# Patient Record
Sex: MALE | Race: WHITE | NOT HISPANIC OR LATINO | URBAN - METROPOLITAN AREA
[De-identification: names, ages, dates, MRNs, and addresses within clinical notes are randomized per-mention and may not be internally consistent; named-entity substitution may affect disease eponyms.]

---

## 2024-09-25 ENCOUNTER — HOSPITAL ENCOUNTER (EMERGENCY)
Facility: HOSPITAL | Age: 29
Discharge: HOME | End: 2024-09-25
Attending: EMERGENCY MEDICINE

## 2024-09-25 VITALS
TEMPERATURE: 98 F | WEIGHT: 195 LBS | SYSTOLIC BLOOD PRESSURE: 126 MMHG | RESPIRATION RATE: 16 BRPM | BODY MASS INDEX: 25.03 KG/M2 | DIASTOLIC BLOOD PRESSURE: 78 MMHG | HEART RATE: 100 BPM | HEIGHT: 74 IN | OXYGEN SATURATION: 94 %

## 2024-09-25 DIAGNOSIS — I48.91 ATRIAL FIBRILLATION: ICD-10-CM

## 2024-09-25 DIAGNOSIS — I48.91 ATRIAL FIBRILLATION WITH RVR: Primary | ICD-10-CM

## 2024-09-25 DIAGNOSIS — R00.2 PALPITATIONS: ICD-10-CM

## 2024-09-25 LAB
ALBUMIN SERPL BCP-MCNC: 4.8 G/DL (ref 3.5–5.2)
ALP SERPL-CCNC: 88 U/L (ref 55–135)
ALT SERPL W/O P-5'-P-CCNC: 36 U/L (ref 10–44)
AMPHET+METHAMPHET UR QL: NEGATIVE
ANION GAP SERPL CALC-SCNC: 14 MMOL/L (ref 8–16)
AST SERPL-CCNC: 34 U/L (ref 10–40)
BARBITURATES UR QL SCN>200 NG/ML: NEGATIVE
BASOPHILS # BLD AUTO: 0.03 K/UL (ref 0–0.2)
BASOPHILS NFR BLD: 0.4 % (ref 0–1.9)
BENZODIAZ UR QL SCN>200 NG/ML: NEGATIVE
BILIRUB SERPL-MCNC: 1.4 MG/DL (ref 0.1–1)
BNP SERPL-MCNC: 30 PG/ML (ref 0–99)
BUN SERPL-MCNC: 14 MG/DL (ref 6–20)
BZE UR QL SCN: NEGATIVE
CALCIUM SERPL-MCNC: 10.1 MG/DL (ref 8.7–10.5)
CANNABINOIDS UR QL SCN: NEGATIVE
CHLORIDE SERPL-SCNC: 111 MMOL/L (ref 95–110)
CO2 SERPL-SCNC: 17 MMOL/L (ref 23–29)
CREAT SERPL-MCNC: 1.2 MG/DL (ref 0.5–1.4)
CREAT UR-MCNC: 31 MG/DL (ref 23–375)
DIFFERENTIAL METHOD BLD: NORMAL
EOSINOPHIL # BLD AUTO: 0 K/UL (ref 0–0.5)
EOSINOPHIL NFR BLD: 0.4 % (ref 0–8)
ERYTHROCYTE [DISTWIDTH] IN BLOOD BY AUTOMATED COUNT: 12 % (ref 11.5–14.5)
EST. GFR  (NO RACE VARIABLE): >60 ML/MIN/1.73 M^2
GLUCOSE SERPL-MCNC: 89 MG/DL (ref 70–110)
HCT VFR BLD AUTO: 51.2 % (ref 40–54)
HGB BLD-MCNC: 17.7 G/DL (ref 14–18)
IMM GRANULOCYTES # BLD AUTO: 0.01 K/UL (ref 0–0.04)
IMM GRANULOCYTES NFR BLD AUTO: 0.1 % (ref 0–0.5)
LYMPHOCYTES # BLD AUTO: 1.9 K/UL (ref 1–4.8)
LYMPHOCYTES NFR BLD: 25.9 % (ref 18–48)
MAGNESIUM SERPL-MCNC: 2.1 MG/DL (ref 1.6–2.6)
MCH RBC QN AUTO: 29.5 PG (ref 27–31)
MCHC RBC AUTO-ENTMCNC: 34.6 G/DL (ref 32–36)
MCV RBC AUTO: 86 FL (ref 82–98)
METHADONE UR QL SCN>300 NG/ML: NEGATIVE
MONOCYTES # BLD AUTO: 0.5 K/UL (ref 0.3–1)
MONOCYTES NFR BLD: 6.4 % (ref 4–15)
NEUTROPHILS # BLD AUTO: 4.8 K/UL (ref 1.8–7.7)
NEUTROPHILS NFR BLD: 66.8 % (ref 38–73)
NRBC BLD-RTO: 0 /100 WBC
OHS QRS DURATION: 80 MS
OHS QRS DURATION: 86 MS
OHS QTC CALCULATION: 403 MS
OHS QTC CALCULATION: 456 MS
OPIATES UR QL SCN: NEGATIVE
PCP UR QL SCN>25 NG/ML: NEGATIVE
PHOSPHATE SERPL-MCNC: 3.2 MG/DL (ref 2.7–4.5)
PLATELET # BLD AUTO: 263 K/UL (ref 150–450)
PMV BLD AUTO: 10.6 FL (ref 9.2–12.9)
POTASSIUM SERPL-SCNC: 4.5 MMOL/L (ref 3.5–5.1)
PROT SERPL-MCNC: 7.4 G/DL (ref 6–8.4)
RBC # BLD AUTO: 5.99 M/UL (ref 4.6–6.2)
SODIUM SERPL-SCNC: 142 MMOL/L (ref 136–145)
T4 FREE SERPL-MCNC: 0.96 NG/DL (ref 0.71–1.51)
TOXICOLOGY INFORMATION: NORMAL
TROPONIN I SERPL DL<=0.01 NG/ML-MCNC: 0.01 NG/ML (ref 0–0.03)
TSH SERPL DL<=0.005 MIU/L-ACNC: 0.47 UIU/ML (ref 0.4–4)
WBC # BLD AUTO: 7.22 K/UL (ref 3.9–12.7)

## 2024-09-25 PROCEDURE — 84484 ASSAY OF TROPONIN QUANT: CPT

## 2024-09-25 PROCEDURE — 93010 ELECTROCARDIOGRAM REPORT: CPT | Mod: 76,,, | Performed by: INTERNAL MEDICINE

## 2024-09-25 PROCEDURE — 85025 COMPLETE CBC W/AUTO DIFF WBC: CPT

## 2024-09-25 PROCEDURE — 83880 ASSAY OF NATRIURETIC PEPTIDE: CPT

## 2024-09-25 PROCEDURE — 93010 ELECTROCARDIOGRAM REPORT: CPT | Mod: ,,, | Performed by: INTERNAL MEDICINE

## 2024-09-25 PROCEDURE — 96375 TX/PRO/DX INJ NEW DRUG ADDON: CPT

## 2024-09-25 PROCEDURE — 93005 ELECTROCARDIOGRAM TRACING: CPT

## 2024-09-25 PROCEDURE — 84439 ASSAY OF FREE THYROXINE: CPT

## 2024-09-25 PROCEDURE — 25000003 PHARM REV CODE 250: Performed by: EMERGENCY MEDICINE

## 2024-09-25 PROCEDURE — 80307 DRUG TEST PRSMV CHEM ANLYZR: CPT

## 2024-09-25 PROCEDURE — 99291 CRITICAL CARE FIRST HOUR: CPT

## 2024-09-25 PROCEDURE — 84100 ASSAY OF PHOSPHORUS: CPT

## 2024-09-25 PROCEDURE — 25000003 PHARM REV CODE 250

## 2024-09-25 PROCEDURE — 83735 ASSAY OF MAGNESIUM: CPT

## 2024-09-25 PROCEDURE — 96374 THER/PROPH/DIAG INJ IV PUSH: CPT

## 2024-09-25 PROCEDURE — 84443 ASSAY THYROID STIM HORMONE: CPT

## 2024-09-25 PROCEDURE — 80053 COMPREHEN METABOLIC PANEL: CPT

## 2024-09-25 PROCEDURE — 96361 HYDRATE IV INFUSION ADD-ON: CPT

## 2024-09-25 RX ORDER — METOPROLOL SUCCINATE 50 MG/1
50 TABLET, EXTENDED RELEASE ORAL DAILY
Qty: 30 TABLET | Refills: 0 | Status: SHIPPED | OUTPATIENT
Start: 2024-09-25 | End: 2025-09-25

## 2024-09-25 RX ORDER — METOPROLOL TARTRATE 1 MG/ML
5 INJECTION, SOLUTION INTRAVENOUS EVERY 10 MIN PRN
Status: COMPLETED | OUTPATIENT
Start: 2024-09-25 | End: 2024-09-25

## 2024-09-25 RX ORDER — METOPROLOL TARTRATE 1 MG/ML
5 INJECTION, SOLUTION INTRAVENOUS EVERY 10 MIN PRN
Status: DISCONTINUED | OUTPATIENT
Start: 2024-09-25 | End: 2024-09-25

## 2024-09-25 RX ADMIN — METOROPROLOL TARTRATE 5 MG: 5 INJECTION, SOLUTION INTRAVENOUS at 02:09

## 2024-09-25 RX ADMIN — SODIUM CHLORIDE 1000 ML: 9 INJECTION, SOLUTION INTRAVENOUS at 03:09

## 2024-09-25 RX ADMIN — METOROPROLOL TARTRATE 5 MG: 5 INJECTION, SOLUTION INTRAVENOUS at 01:09

## 2024-09-25 NOTE — DISCHARGE INSTRUCTIONS
We ordered metoprolol succinate (torprol-xl) to maintain rate control of your atrial fibrillation and apixaban (eliquis) to begin taking in preparation for possible cardioversion in the future. Follow-up with cardiology (referral is placed) to repeat an EKG and for further management of atrial fibrillation.

## 2024-09-25 NOTE — ED NOTES
Reji Badillo, a 28 y.o. male presents to the ED w/ complaint of palpitations which began earlier while at work    Triage note:  Chief Complaint   Patient presents with    Palpitations     Pt arrives c/o palpitations and dizziness.     Review of patient's allergies indicates:  No Known Allergies  History reviewed. No pertinent past medical history.     LOC: The patient is awake, alert, aware of environment with an appropriate affect. Oriented x4, speaking appropriately  APPEARANCE: Pt resting comfortably, in no acute distress, pt is clean and well groomed, clothing properly fastened  SKIN:The skin is warm and dry, color consistent with ethnicity, patient has normal skin turgor and moist mucus membranes, no bruising noted   RESPIRATORY:Airway is open and patent, respirations are spontaneous, patient has a normal effort and rate, no accessory muscle use noted.  CARDIAC: tachy rate and irregular rhythm, no peripheral edema noted, capillary refill < 3 seconds, bilateral radial pulses 2+.  ABDOMEN: Soft, non tender, non distended. No complaints of n/v/d  NEUROLOGIC: PERRLA, facial expression is symmetrical, patient moving all extremities spontaneously, normal sensation in all extremities when touched with a finger.  Follows all commands appropriately  MUSCULOSKELETAL: Patient moving all extremities spontaneously, no obvious swelling or deformities noted.

## 2024-09-25 NOTE — ED PROVIDER NOTES
Encounter Date: 9/25/2024       History     Chief Complaint   Patient presents with    Palpitations     Pt arrives c/o palpitations and dizziness.     Reji Badillo is a 29yo M with no significant past medical history who presents to the ED for palpitations. 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 He has no known cardiac or thyroid disease, takes no medications, denies drug use, no known sick contacts or recent illnesses, no known exposures.     The history is provided by the patient. No  was used.     Review of patient's allergies indicates:  No Known Allergies  History reviewed. No pertinent past medical history.  Past Surgical History:   Procedure Laterality Date    SEPTOPLASTY, NOSE, WITH NASAL TURBINATE REDUCTION Bilateral 09/19/2023     No family history on file.  Social History     Tobacco Use    Smoking status: Never    Smokeless tobacco: Never     Review of Systems    Physical Exam     Initial Vitals [09/25/24 0016]   BP Pulse Resp Temp SpO2   (!) 140/88 98 14 97.8 °F (36.6 °C) 98 %      MAP       --         Physical Exam    Constitutional: He appears well-developed and well-nourished. He is not diaphoretic.   HENT:   Head: Normocephalic and atraumatic.   Eyes: EOM are normal.   Neck:   Normal range of motion.  Cardiovascular:  Intact distal pulses. An irregularly irregular rhythm present.   Tachycardia present.         No murmur heard.  Pulses:       Radial pulses are 2+ on the right side and 2+ on the left side.   Pulmonary/Chest: Breath sounds normal. No respiratory distress. He has no wheezes.   Abdominal: Abdomen is soft. He exhibits no distension. There is no abdominal tenderness.   Musculoskeletal:         General: No edema.      Cervical back: Normal range of motion.     Neurological: He is alert and oriented to person, place, and time.   Skin: Skin is warm and dry. Capillary refill takes less than 2 seconds.    Psychiatric: He has a normal mood and affect.         ED Course   Critical Care    Date/Time: 9/25/2024 4:17 AM    Performed by: Shoaib Ren III, MD  Authorized by: Shoaib Rne III, MD  Total critical care time (exclusive of procedural time) : 30 minutes  Critical care time was exclusive of separately billable procedures and treating other patients and teaching time.  Critical care was necessary to treat or prevent imminent or life-threatening deterioration of the following conditions: cardiac failure.  Comments: Patient required numerous evaluations of his cardiopulmonary status during his course in the emergency department for his life-threatening AFib with RVR requiring multiple doses of IV rate control medication        Labs Reviewed   COMPREHENSIVE METABOLIC PANEL - Abnormal       Result Value    Sodium 142      Potassium 4.5      Chloride 111 (*)     CO2 17 (*)     Glucose 89      BUN 14      Creatinine 1.2      Calcium 10.1      Total Protein 7.4      Albumin 4.8      Total Bilirubin 1.4 (*)     Alkaline Phosphatase 88      AST 34      ALT 36      eGFR >60.0      Anion Gap 14      Narrative:     Release to patient->Immediate   TSH    TSH 0.475      Narrative:     Release to patient->Immediate   TROPONIN I    Troponin I 0.009      Narrative:     Release to patient->Immediate   CBC W/ AUTO DIFFERENTIAL    WBC 7.22      RBC 5.99      Hemoglobin 17.7      Hematocrit 51.2      MCV 86      MCH 29.5      MCHC 34.6      RDW 12.0      Platelets 263      MPV 10.6      Immature Granulocytes 0.1      Gran # (ANC) 4.8      Immature Grans (Abs) 0.01      Lymph # 1.9      Mono # 0.5      Eos # 0.0      Baso # 0.03      nRBC 0      Gran % 66.8      Lymph % 25.9      Mono % 6.4      Eosinophil % 0.4      Basophil % 0.4      Differential Method Automated      Narrative:     Release to patient->Immediate   B-TYPE NATRIURETIC PEPTIDE    BNP 30      Narrative:     Release to patient->Immediate   MAGNESIUM    Magnesium 2.1       Narrative:     Release to patient->Immediate   PHOSPHORUS    Phosphorus 3.2      Narrative:     Release to patient->Immediate   DRUG SCREEN PANEL, URINE EMERGENCY    Benzodiazepines Negative      Methadone metabolites Negative      Cocaine (Metab.) Negative      Opiate Scrn, Ur Negative      Barbiturate Screen, Ur Negative      Amphetamine Screen, Ur Negative      THC Negative      Phencyclidine Negative      Creatinine, Urine 31.0      Toxicology Information SEE COMMENT      Narrative:     Specimen Source->Urine   T4, FREE    Free T4 0.96      Narrative:     Release to patient->Immediate          Imaging Results    None          Medications   metoprolol injection 5 mg (5 mg Intravenous Given 9/25/24 0219)   sodium chloride 0.9% bolus 1,000 mL 1,000 mL (0 mLs Intravenous Stopped 9/25/24 6985)     Medical Decision Making  27yo with no significant medical history who presented for palpitations. Found to be in atrial fibrillation with RVR.     Differentials for inciting etiology include structural heart disease, cardiac valve dysfunction, thyroid disorder, substance use, electrolyte disturbance, pericarditis, ACS, AFib with RVR, SVT    Patient comfortable aside from palpitations and blood pressure normo to hypertensive. Stable. Metoprolol IV 5mg x3 given with rate control. 1L fluid bolus given. Patient notes he does not have health insurance and is concerned about expense. Patient declined chest X-ray.     Plan for discharge home with metoprolol succinate for ongoing rate control. Apixaban 5mg BID in preparation for possible future cardioversion if persistent atrial fibrillation, follow-up with cardiology outpatient. Referral placed.     Amount and/or Complexity of Data Reviewed  External Data Reviewed: notes.  Labs: ordered. Decision-making details documented in ED Course.  Radiology: ordered.  ECG/medicine tests: ordered and independent interpretation performed.     Details: EKG: AFib with RVR, mild diffuse  J-point elevation in all leads about 1 box diffusely    Risk  Prescription drug management.  Drug therapy requiring intensive monitoring for toxicity.              Attending Attestation:   Physician Attestation Statement for Resident:  As the supervising MD   Physician Attestation Statement: I have personally seen and examined this patient.   I agree with the above history.  -: Palpitations, new onset AFib with RVR   As the supervising MD I agree with the above PE.     As the supervising MD I agree with the above treatment, course, plan, and disposition.                    ED Course as of 09/25/24 0417   Wed Sep 25, 2024   0153 CBC auto differential  CBC unremarkable [AK]   0154 X-Ray Chest PA And Lateral  Pt declined CXR [AK]   0220 Drug screen panel, emergency  Negative UDS [AK]   0220 Comprehensive metabolic panel(!)  Bicarb 17, otherwise unremarkable [AK]   0221 Magnesium  normal [AK]   0221 Phosphorus  normal [AK]   0221 Troponin I  Negative troponin [AK]   0221 Brain natriuretic peptide  Negative BNP [AK]      ED Course User Index  [AK] Sunil Sheldon MD                             Clinical Impression:  Final diagnoses:  [I48.91] Atrial fibrillation with RVR (Primary)  [R00.2] Palpitations  [I48.91] Atrial fibrillation          ED Disposition Condition    Discharge Stable          ED Prescriptions       Medication Sig Dispense Start Date End Date Auth. Provider    apixaban (ELIQUIS) 5 mg Tab Take 1 tablet (5 mg total) by mouth 2 (two) times daily. 60 tablet 9/25/2024 -- Sunil Sheldon MD    metoprolol succinate (TOPROL-XL) 50 MG 24 hr tablet Take 1 tablet (50 mg total) by mouth once daily. 30 tablet 9/25/2024 9/25/2025 Sunil Sheldon MD          Follow-up Information    None          Sunil Sheldon MD  Resident  09/25/24 031       Shoaib Ren III, MD  09/25/24 0354

## 2024-09-25 NOTE — ED NOTES
Nurses Note -- 4 Eyes      9/25/2024   0350      Skin assessed during: Admit      [x] No Altered Skin Integrity Present    []Prevention Measures Documented      [] Yes- Altered Skin Integrity Present or Discovered   [] LDA Added if Not in Epic (Describe Wound)   [] New Altered Skin Integrity was Present on Admit and Documented in LDA   [] Wound Image Taken    Wound Care Consulted? No    Attending Nurse:  Roxanne Vanegas RN     Second RN/Staff Member:

## 2024-09-27 ENCOUNTER — TELEPHONE (OUTPATIENT)
Dept: PHARMACY | Facility: CLINIC | Age: 29
End: 2024-09-27

## 2024-09-27 NOTE — LETTER
September 27, 2024    Reji Badillo  [address] [facility] [address]             [facility]  [address]  [facility] [address]  Fax: [phone] Dear Mr. Badillo,    My name is Daniele Barbara  I am reaching out on behalf of [facility]s Pharmacy Patient Assistance Team after receiving a referral from your Provider inquiring about assistance with your medication. I tried to contact you on 9/27/2024 . Sorry we missed you. Our goal is to assist qualified patients with financial assistance for their medications to better help enrollment for their medications to better help you achieve your health goals.    Please note that enrollment into available support will require the following documents:    Proof of household Income (such as social security statement, 1099 form, pension statement or 3 consecutive pay stubs)  Copy of all insurance cards (front and back)  Print out from your insurance or pharmacy showing how much you have spent on prescriptions this year  Completed Medication Access Center Authorization Forms       Please reach out to my phone number below if you are still in need of assistance with your medications. Follow-up attempt to reach you through MyChart or letter will be made in 5 business days. We look forward to hearing from you soon!    Thank you for choosing [facility] for your healthcare needs    Sincerely  Daniele MALHOTRA @[phone]  Pharmacy Patient Assistance Team  [address]  [id number]  [place] [address]  Fax: [phone]  Email: [email or web address]

## 2024-09-27 NOTE — TELEPHONE ENCOUNTER
We have reached out to Mr. Badillo to inform him of the [employer]  application process for Eliquis and whats required to apply.  He did not answer.         I mailed a letter and sent a portal message   I left a voicemail       Follow-up will be made in 5 business days.    Daniele Jimenez  Pharmacy Patient Assistance Team

## 2024-09-27 NOTE — TELEPHONE ENCOUNTER
----- Message from Pat Adair sent at 9/27/2024  4:16 PM CDT -----  Good afternoon,         I was a patient at [facility] this morning and was discharged around 4am. I was treated for new onset Afib RVR and now need eliquis for anticoagulation. I was hoping you could contact me regarding financial assistance in obtaining this medication. Please reach out to me as soon as possible. My phone number is [phone]            With appreciation,      Reji Badillo

## 2024-10-08 ENCOUNTER — TELEPHONE (OUTPATIENT)
Dept: PHARMACY | Facility: CLINIC | Age: 29
End: 2024-10-08

## 2024-10-08 NOTE — TELEPHONE ENCOUNTER
A 2nd attempt has been made to establish contact with Mr. Badillo  via left a voicemail . The final contact attempt will be made in 5 business days     [facility]  Pharmacy Patient Assistance Team

## 2024-10-11 ENCOUNTER — HOSPITAL ENCOUNTER (OUTPATIENT)
Dept: CARDIOLOGY | Facility: HOSPITAL | Age: 29
Discharge: HOME | End: 2024-10-11
Attending: INTERNAL MEDICINE

## 2024-10-11 ENCOUNTER — OFFICE VISIT (OUTPATIENT)
Dept: CARDIOLOGY | Facility: CLINIC | Age: 29
End: 2024-10-11

## 2024-10-11 VITALS
BODY MASS INDEX: 26.06 KG/M2 | WEIGHT: 202.94 LBS | OXYGEN SATURATION: 97 % | HEART RATE: 53 BPM | SYSTOLIC BLOOD PRESSURE: 94 MMHG | DIASTOLIC BLOOD PRESSURE: 57 MMHG

## 2024-10-11 VITALS
WEIGHT: 195 LBS | SYSTOLIC BLOOD PRESSURE: 112 MMHG | DIASTOLIC BLOOD PRESSURE: 70 MMHG | HEIGHT: 74 IN | BODY MASS INDEX: 25.03 KG/M2 | HEART RATE: 55 BPM

## 2024-10-11 DIAGNOSIS — I48.91 ATRIAL FIBRILLATION WITH RVR: ICD-10-CM

## 2024-10-11 LAB
ASCENDING AORTA: 2.16 CM
AV AREA BY CONTINUOUS VTI: 3.5 CM2
AV INDEX (PROSTH): 0.86
AV LVOT MEAN GRADIENT: 2 MMHG
AV LVOT PEAK GRADIENT: 3 MMHG
AV MEAN GRADIENT: 2.8 MMHG
AV PEAK GRADIENT: 4.8 MMHG
AV VALVE AREA BY VELOCITY RATIO: 3.4 CM²
AV VALVE AREA: 3.6 CM2
AV VELOCITY RATIO: 0.82
BSA FOR ECHO PROCEDURE: 2.15 M2
CV ECHO LV RWT: 0.33 CM
DOP CALC AO PEAK VEL: 1.1 M/S
DOP CALC AO VTI: 24.4 CM
DOP CALC LVOT AREA: 4.2 CM2
DOP CALC LVOT DIAMETER: 2.3 CM
DOP CALC LVOT PEAK VEL: 0.9 M/S
DOP CALC LVOT STROKE VOLUME: 87.2 CM3
DOP CALCLVOT PEAK VEL VTI: 21 CM
E WAVE DECELERATION TIME: 133.1 MS
E/A RATIO: 1.69
E/E' RATIO: 4.72 M/S
ECHO EF ESTIMATED: 63 %
ECHO LV POSTERIOR WALL: 0.8 CM (ref 0.6–1.1)
FRACTIONAL SHORTENING: 34.7 % (ref 28–44)
INTERVENTRICULAR SEPTUM: 0.7 CM (ref 0.6–1.1)
IVC DIAMETER: 2.31 CM
LA MAJOR: 5.05 CM
LA MINOR: 5.07 CM
LA WIDTH: 3.09 CM
LEFT ATRIUM SIZE: 3.38 CM
LEFT ATRIUM VOLUME INDEX MOD: 17.4 ML/M2
LEFT ATRIUM VOLUME INDEX: 20.9 ML/M2
LEFT ATRIUM VOLUME MOD: 37.47 ML
LEFT ATRIUM VOLUME: 44.92 CM3
LEFT INTERNAL DIMENSION IN SYSTOLE: 3.2 CM (ref 2.1–4)
LEFT VENTRICLE DIASTOLIC VOLUME INDEX: 53 ML/M2
LEFT VENTRICLE DIASTOLIC VOLUME: 113.95 ML
LEFT VENTRICLE MASS INDEX: 56.2 G/M2
LEFT VENTRICLE SYSTOLIC VOLUME INDEX: 19.6 ML/M2
LEFT VENTRICLE SYSTOLIC VOLUME: 42.19 ML
LEFT VENTRICULAR INTERNAL DIMENSION IN DIASTOLE: 4.9 CM (ref 3.5–6)
LEFT VENTRICULAR MASS: 120.8 G
LV LATERAL E/E' RATIO: 4.21
LV SEPTAL E/E' RATIO: 5.36
MV PEAK A VEL: 0.35 M/S
MV PEAK E VEL: 0.59 M/S
OHS CV RV/LV RATIO: 0.82 CM
PISA TR MAX VEL: 2.28 M/S
RA MAJOR: 5.1 CM
RA PRESSURE ESTIMATED: 8 MMHG
RA WIDTH: 4.11 CM
RIGHT ATRIAL AREA: 18.2 CM2
RIGHT VENTRICLE DIASTOLIC BASEL DIMENSION: 4 CM
RV TB RVSP: 10 MMHG
RV TISSUE DOPPLER FREE WALL SYSTOLIC VELOCITY 1 (APICAL 4 CHAMBER VIEW): 13.95 CM/S
SINUS: 2.69 CM
STJ: 2.15 CM
TDI LATERAL: 0.14 M/S
TDI SEPTAL: 0.11 M/S
TDI: 0.13 M/S
TR MAX PG: 21 MMHG
TRICUSPID ANNULAR PLANE SYSTOLIC EXCURSION: 2.54 CM
TV PEAK GRADIENT: 21 MMHG
TV REST PULMONARY ARTERY PRESSURE: 29 MMHG
Z-SCORE OF LEFT VENTRICULAR DIMENSION IN END DIASTOLE: -3.54
Z-SCORE OF LEFT VENTRICULAR DIMENSION IN END SYSTOLE: -2.25

## 2024-10-11 PROCEDURE — 99213 OFFICE O/P EST LOW 20 MIN: CPT | Mod: PBBFAC,PN | Performed by: INTERNAL MEDICINE

## 2024-10-11 PROCEDURE — 93306 TTE W/DOPPLER COMPLETE: CPT | Mod: 26,,, | Performed by: STUDENT IN AN ORGANIZED HEALTH CARE EDUCATION/TRAINING PROGRAM

## 2024-10-11 PROCEDURE — 93306 TTE W/DOPPLER COMPLETE: CPT

## 2024-10-11 PROCEDURE — 99999 PR PBB SHADOW E&M-EST. PATIENT-LVL III: CPT | Mod: PBBFAC,,, | Performed by: INTERNAL MEDICINE

## 2024-10-11 RX ORDER — METOPROLOL SUCCINATE 25 MG/1
25 TABLET, EXTENDED RELEASE ORAL DAILY
Qty: 90 TABLET | Refills: 3 | Status: SHIPPED | OUTPATIENT
Start: 2024-10-11 | End: 2025-10-11

## 2024-10-11 NOTE — PROGRESS NOTES
"Subjective:   @Patient ID:  Reji Badillo is a 29 y.o. male who presents for evaluation of No chief complaint on file.      HPI:        Patient is a 29-year-old male, works as an ICU nurse at [employer], previous history of nasal septoplasty, no other issues reported, euvolemic on examination, recently went to the ER for tachycardia and abnormal heartbeats was noted to be in atrial fibrillation, patient was started on Eliquis and Toprol-XL 50 mg daily.  Blood pressure has been  90s over 50s since starting metoprolol.  Patient has spontaneously converted to normal sinus rhythm.  On EKG today shows sinus bradycardia.  Good exercise capacity.  Works in the gym regularly.  No other issues reported.  Lab workup including TSH in the ER was within normal limits           We have discussed possible etiologies of atrial fibrillation.  He will get an echocardiogram today.  Also we will be getting sleep study for further evaluation of obstructive sleep apnea.  Patient is stressed about overall diagnosis.  We have discussed low CHADS-VASc score in our clinic today as well.            There are no active problems to display for this patient.                   LAST HbA1c  No results found for: "HGBA1C"    Lipid panel  No results found for: "CHOL"  No results found for: "HDL"  No results found for: "LDLCALC"  No results found for: "TRIG"  No results found for: "CHOLHDL"         Review of Systems   Constitutional: Negative for chills and fever.   HENT:  Negative for hearing loss and nosebleeds.    Eyes:  Negative for blurred vision.   Cardiovascular:  Negative for chest pain, leg swelling and palpitations.   Respiratory:  Negative for hemoptysis and shortness of breath.    Hematologic/Lymphatic: Negative for bleeding problem.   Skin:  Negative for itching.   Musculoskeletal:  Negative for falls.   Gastrointestinal:  Negative for abdominal pain and hematochezia.   Genitourinary:  Negative for hematuria.   Neurological:  " Negative for dizziness and loss of balance.   Psychiatric/Behavioral:  Negative for altered mental status and depression.        Objective:   Physical Exam  Constitutional:       Appearance: He is well-developed.   HENT:      Head: Normocephalic and atraumatic.   Eyes:      Conjunctiva/sclera: Conjunctivae normal.   Neck:      Vascular: No carotid bruit or JVD.   Cardiovascular:      Rate and Rhythm: Normal rate and regular rhythm.      Pulses:           Carotid pulses are 2+ on the right side and 2+ on the left side.       Radial pulses are 2+ on the right side and 2+ on the left side.      Heart sounds: Normal heart sounds. No murmur heard.     No friction rub. No gallop.   Pulmonary:      Effort: Pulmonary effort is normal. No respiratory distress.      Breath sounds: Normal breath sounds. No stridor. No wheezing.   Musculoskeletal:      Cervical back: Neck supple.   Skin:     General: Skin is warm and dry.   Neurological:      Mental Status: He is alert and oriented to person, place, and time.   Psychiatric:         Behavior: Behavior normal.         Assessment:     1. Atrial fibrillation with RVR        Plan:       -   CHADS-VASc score of 0 no major  clinical findings on physical examination.  Lab workup within normal range.  TSH normal.  Recommend getting echocardiogram to rule out mitral valve abnormalities and left atrial enlargement.  -  Toprol-XL 50 mg dose is too high for the patient getting bradycardic and hypotensive.  Recommend transitioning to 25 mg Toprol-XL daily.  In the long term my plan is to initially keep him on as-needed metoprolol.  If any more symptoms I will consider pill in the pocket approach with flecainide.    Pertinent cardiac images and EKG reviewed independently.    Continue with current medical plan and lifestyle changes.  Return sooner for concerns or questions. If symptoms persist go to the ED  I have reviewed all pertinent data including patient's medical history in detail and  updated the computerized patient record.     Orders Placed This Encounter   Procedures    Ambulatory referral/consult to Sleep Disorders     Standing Status:   Future     Standing Expiration Date:   11/11/2025     Referral Priority:   Routine     Referral Type:   Consultation     Requested Specialty:   Sleep Medicine     Number of Visits Requested:   1    IN OFFICE EKG 12-LEAD (to [place])    Echo     Standing Status:   Future     Standing Expiration Date:   10/11/2025     Order Specific Question:   Release to patient     Answer:   Immediate       Follow up as scheduled.     He expressed verbal understanding and agreed with the plan    Patient's Medications   New Prescriptions    METOPROLOL SUCCINATE (TOPROL-XL) 25 MG 24 HR TABLET    Take 1 tablet (25 mg total) by mouth once daily.   Previous Medications    No medications on file   Modified Medications    No medications on file   Discontinued Medications    APIXABAN (ELIQUIS) 5 MG TAB    Take 1 tablet (5 mg total) by mouth 2 (two) times daily.    METOPROLOL SUCCINATE (TOPROL-XL) 50 MG 24 HR TABLET    Take 1 tablet (50 mg total) by mouth once daily.        Ja Guerra M.D

## 2024-11-20 ENCOUNTER — OFFICE VISIT (OUTPATIENT)
Dept: CARDIOLOGY | Facility: CLINIC | Age: 29
End: 2024-11-20

## 2024-11-20 VITALS
BODY MASS INDEX: 26.33 KG/M2 | WEIGHT: 205.13 LBS | SYSTOLIC BLOOD PRESSURE: 107 MMHG | HEART RATE: 66 BPM | DIASTOLIC BLOOD PRESSURE: 63 MMHG | HEIGHT: 74 IN | OXYGEN SATURATION: 97 %

## 2024-11-20 DIAGNOSIS — I48.91 ATRIAL FIBRILLATION, UNSPECIFIED TYPE: Primary | ICD-10-CM

## 2024-11-20 PROCEDURE — 99999 PR PBB SHADOW E&M-EST. PATIENT-LVL III: CPT | Mod: PBBFAC,,, | Performed by: INTERNAL MEDICINE

## 2024-11-20 PROCEDURE — 99213 OFFICE O/P EST LOW 20 MIN: CPT | Mod: PBBFAC,PN | Performed by: INTERNAL MEDICINE

## 2024-11-20 PROCEDURE — 99213 OFFICE O/P EST LOW 20 MIN: CPT | Mod: S$PBB,,, | Performed by: INTERNAL MEDICINE

## 2024-11-20 NOTE — PROGRESS NOTES
"Subjective:   @Patient ID:  Reji Badillo is a 29 y.o. male who presents for follow-up of No chief complaint on file.      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  Previous admission to ER for tachycardia noted to be in Afib.  Spontaneously converted to normal sinus rhythm on Toprol-XL 50 mg daily.  Last visit the patient was told to get an echocardiogram and dose of metoprolol was switched to 25 mg daily.            Here to discuss results of echocardiogram.  Euvolemic on examination.  No more episodes of atrial fibrillation.       Results for orders placed during the hospital encounter of 10/11/24    Echo    Interpretation Summary    Left Ventricle: The left ventricle is normal in size. Normal wall thickness. Normal wall motion. There is normal systolic function with a visually estimated ejection fraction of 55 - 60%. There is normal diastolic function.    Right Ventricle: Normal right ventricular cavity size. Wall thickness is normal. Systolic function is normal.    Right Atrium: Right atrium is upper limits of normal in size.    Mitral Valve: There is mild regurgitation.    Tricuspid Valve: There is mild regurgitation.    Pulmonary Artery: The estimated pulmonary artery systolic pressure is 29 mmHg.    IVC/SVC: Intermediate venous pressure at 8 mmHg.      No results found for this or any previous visit.      No results found for this or any previous visit.      Please document below the medical necessity for continuous telemetry monitoring or discontinue the current order if appropriate.    Current rhythm from flowsheet:               There are no active problems to display for this patient.                   LAST HbA1c  No results found for: "HGBA1C"    Lipid panel  No results found for: "CHOL"  No results found for: "HDL"  No results found for: " ""LDLCALC"  No results found for: "TRIG"  No results found for: "CHOLHDL"         Review of Systems   Constitutional: Negative for chills and fever.   HENT:  Negative for hearing loss and nosebleeds.    Eyes:  Negative for blurred vision.   Cardiovascular:  Negative for chest pain, leg swelling and palpitations.   Respiratory:  Negative for hemoptysis and shortness of breath.    Hematologic/Lymphatic: Negative for bleeding problem.   Skin:  Negative for itching.   Musculoskeletal:  Negative for falls.   Gastrointestinal:  Negative for abdominal pain and hematochezia.   Genitourinary:  Negative for hematuria.   Neurological:  Negative for dizziness and loss of balance.   Psychiatric/Behavioral:  Negative for altered mental status and depression.        Objective:   Physical Exam  Constitutional:       Appearance: He is well-developed.   HENT:      Head: Normocephalic and atraumatic.   Eyes:      Conjunctiva/sclera: Conjunctivae normal.   Neck:      Vascular: No carotid bruit or JVD.   Cardiovascular:      Rate and Rhythm: Normal rate and regular rhythm.      Pulses:           Carotid pulses are 2+ on the right side and 2+ on the left side.       Radial pulses are 2+ on the right side and 2+ on the left side.      Heart sounds: Normal heart sounds. No murmur heard.     No friction rub. No gallop.   Pulmonary:      Effort: Pulmonary effort is normal. No respiratory distress.      Breath sounds: Normal breath sounds. No stridor. No wheezing.   Musculoskeletal:      Cervical back: Neck supple.   Skin:     General: Skin is warm and dry.   Neurological:      Mental Status: He is alert and oriented to person, place, and time.   Psychiatric:         Behavior: Behavior normal.         Assessment:     1. Atrial fibrillation, unspecified type        Plan:     -   I have reviewed the echocardiogram with the patient.   Right atrial pressure was reported to be 8 mm Hg but IVC measures less than 2 cm and has good collapsibility, " right atrial pressure actually lower than 8.  Trivial tricuspid regurgitation.  Does not have pulmonary hypertension based on  my personal review of echocardiogram.  Does have trivial to mild mitral regurgitation with left atrial dimension is within normal range.  -   I recommend repeating the echocardiogram within the next 3-5 years.  We have discussed PA pressures in detail.  PA systolic pressure less than 30 mm Hg and IVC within normal range.  True estimate of PA pressures likely within the  20-25 mm Hg range.  -   Can stop metoprolol if gets too bradycardic or dizzy.  Otherwise continue Toprol-XL 25 mg daily.  No symptoms that are classical for sleep apnea reported.  Recommend staying physically active.  Continues to wear an Apple watch.  TSH normal range.  Follow up within 6 months or as needed.  Recommend against anticoagulation or antiarrhythmic therapy at this point.    Pertinent cardiac images and EKG reviewed independently.    Continue with current medical plan and lifestyle changes.  Return sooner for concerns or questions. If symptoms persist go to the ED  I have reviewed all pertinent data including patient's medical history in detail and updated the computerized patient record.     Orders Placed This Encounter   Procedures    Comprehensive Metabolic Panel     Standing Status:   Future     Standing Expiration Date:   2/18/2026     Order Specific Question:   Send normal result to authorizing provider's In Basket if patient is active on MyChart:     Answer:   Yes       Follow up as scheduled.     He expressed verbal understanding and agreed with the plan    Patient's Medications   New Prescriptions    No medications on file   Previous Medications    METOPROLOL SUCCINATE (TOPROL-XL) 25 MG 24 HR TABLET    Take 1 tablet (25 mg total) by mouth once daily.   Modified Medications    No medications on file   Discontinued Medications    No medications on file        Ja Guerra M.D